# Patient Record
Sex: MALE | Race: BLACK OR AFRICAN AMERICAN | Employment: UNEMPLOYED | URBAN - METROPOLITAN AREA
[De-identification: names, ages, dates, MRNs, and addresses within clinical notes are randomized per-mention and may not be internally consistent; named-entity substitution may affect disease eponyms.]

---

## 2019-04-21 ENCOUNTER — APPOINTMENT (OUTPATIENT)
Dept: GENERAL RADIOLOGY | Age: 29
End: 2019-04-21
Payer: MEDICAID

## 2019-04-21 ENCOUNTER — HOSPITAL ENCOUNTER (EMERGENCY)
Age: 29
Discharge: HOME | End: 2019-04-21
Attending: EMERGENCY MEDICINE
Payer: MEDICAID

## 2019-04-21 VITALS
HEART RATE: 108 BPM | BODY MASS INDEX: 24.69 KG/M2 | TEMPERATURE: 97.7 F | SYSTOLIC BLOOD PRESSURE: 138 MMHG | DIASTOLIC BLOOD PRESSURE: 81 MMHG | OXYGEN SATURATION: 100 % | RESPIRATION RATE: 21 BRPM | WEIGHT: 135 LBS

## 2019-04-21 DIAGNOSIS — M21.829 HILL SACHS DEFORMITY: ICD-10-CM

## 2019-04-21 DIAGNOSIS — S43.004A DISLOCATION OF RIGHT SHOULDER JOINT, INITIAL ENCOUNTER: Primary | ICD-10-CM

## 2019-04-21 PROCEDURE — 73030 X-RAY EXAM OF SHOULDER: CPT

## 2019-04-21 PROCEDURE — 23650 CLTX SHO DSLC W/MNPJ WO ANES: CPT

## 2019-04-21 PROCEDURE — 2500000003 HC RX 250 WO HCPCS: Performed by: STUDENT IN AN ORGANIZED HEALTH CARE EDUCATION/TRAINING PROGRAM

## 2019-04-21 PROCEDURE — 96374 THER/PROPH/DIAG INJ IV PUSH: CPT

## 2019-04-21 PROCEDURE — 99152 MOD SED SAME PHYS/QHP 5/>YRS: CPT

## 2019-04-21 PROCEDURE — 6360000002 HC RX W HCPCS: Performed by: STUDENT IN AN ORGANIZED HEALTH CARE EDUCATION/TRAINING PROGRAM

## 2019-04-21 PROCEDURE — 96375 TX/PRO/DX INJ NEW DRUG ADDON: CPT

## 2019-04-21 PROCEDURE — 99283 EMERGENCY DEPT VISIT LOW MDM: CPT

## 2019-04-21 RX ORDER — LORAZEPAM 2 MG/ML
1 INJECTION INTRAMUSCULAR ONCE
Status: COMPLETED | OUTPATIENT
Start: 2019-04-21 | End: 2019-04-21

## 2019-04-21 RX ORDER — KETAMINE HYDROCHLORIDE 10 MG/ML
1.5 INJECTION, SOLUTION INTRAMUSCULAR; INTRAVENOUS ONCE
Status: COMPLETED | OUTPATIENT
Start: 2019-04-21 | End: 2019-04-21

## 2019-04-21 RX ADMIN — KETAMINE HYDROCHLORIDE 90 MG: 10 INJECTION, SOLUTION INTRAMUSCULAR; INTRAVENOUS at 15:22

## 2019-04-21 RX ADMIN — LORAZEPAM 1 MG: 2 INJECTION INTRAMUSCULAR; INTRAVENOUS at 15:22

## 2019-04-21 ASSESSMENT — ENCOUNTER SYMPTOMS
RHINORRHEA: 0
DIARRHEA: 0
VOMITING: 0
ABDOMINAL DISTENTION: 0
TROUBLE SWALLOWING: 0
SHORTNESS OF BREATH: 0
CONSTIPATION: 0
NAUSEA: 0
COUGH: 0
COLOR CHANGE: 0
ABDOMINAL PAIN: 0

## 2019-04-21 ASSESSMENT — PAIN SCALES - GENERAL: PAINLEVEL_OUTOF10: 0

## 2019-04-21 NOTE — ED NOTES
Pt placed on cardiac monitor, pulse ox and BP cuff. Alarms on.       Jami Hastings RN  04/21/19 3888

## 2019-04-21 NOTE — ED PROVIDER NOTES
[address]  ED  EMERGENCY DEPARTMENT ENCOUNTER  RESIDENT    Pt Name: Allyn King  MRN: [id number]  [place] 1990  Date of evaluation: 4/21/2019  PCP:  No primary care provider on file. CHIEF COMPLAINT       Chief Complaint   Patient presents with    Shoulder Pain     pt to ER from assisted, pt chronically has issues with his right shoulder dislocating, denies trauma or pain     HISTORY OF PRESENT ILLNESS    Allyn King is a 34 y.o. male who presents for right shoulder pain and concern for dislocation that started abruptly one hour ago. Patient has history of multiple anterior shoulder dislocations in the past. Patient is from assisted. He states that he was closing a shower curtain behind him when he felt sudden pop and pain in his right arm. Since that time he has had difficulty moving his right shoulder. He denies difficulty moving his fingers, numbness or tingling in the arm. ROS otherwise negative. Location/Symptom: right shoulder pain/ difficulty with ROM  Timing/Onset: 1 hour ago  Context/Setting: patient was closing shower curtain when it occured  Quality: not painful at this time  Duration: continous  Modifying Factors: none  Severity: N/A     REVIEW OF SYSTEMS       Review of Systems   Constitutional: Negative for chills, fatigue and fever. HENT: Negative for rhinorrhea and trouble swallowing. Eyes: Negative for visual disturbance. Respiratory: Negative for cough and shortness of breath. Cardiovascular: Negative for chest pain, palpitations and leg swelling. Gastrointestinal: Negative for abdominal distention, abdominal pain, constipation, diarrhea, nausea and vomiting. Genitourinary: Negative for dysuria and urgency. Musculoskeletal: Negative for joint swelling. Right shoulder dislocation, decreased ROM at right shoulder   Skin: Negative for color change and wound. Neurological: Negative for dizziness and headaches.    Psychiatric/Behavioral: Negative for agitation and behavioral problems. PAST MEDICAL HISTORY    has a past medical history of Shoulder dislocation, recurrent. SURGICAL HISTORY      has no past surgical history on file. CURRENT MEDICATIONS       Previous Medications    IBUPROFEN (ADVIL;MOTRIN) 800 MG TABLET    Take 1 tablet by mouth every 8 hours as needed for Pain. OXYCODONE-ACETAMINOPHEN (PERCOCET) 5-325 MG PER TABLET    Take 1 tablet by mouth every 6 hours as needed for Pain for 10 doses. WARNING:  May cause drowsiness. May impair ability to operate vehicles or machinery. Do not use in combination with alcohol. ALLERGIES     has No Known Allergies. FAMILY HISTORY     has no family status information on file. family history is not on file. SOCIAL HISTORY      reports that he has been smoking cigars. He has been smoking about 1.00 pack per day. He has never used smokeless tobacco. He reports that he drinks alcohol. He reports that he has current or past drug history. Drug: Marijuana. PHYSICAL EXAM     INITIAL VITALS:  weight is 135 lb (61.2 kg). His oral temperature is 97.7 °F (36.5 °C). His blood pressure is 138/81 and his pulse is 108. His respiration is 21 and oxygen saturation is 100%. Physical Exam   Constitutional: He is oriented to person, place, and time. He appears well-developed and well-nourished. No distress. HENT:   Head: Normocephalic and atraumatic. Eyes: Pupils are equal, round, and reactive to light. Conjunctivae and EOM are normal.   Neck: Normal range of motion. Neck supple. Cardiovascular: Normal rate, regular rhythm, normal heart sounds and intact distal pulses. No murmur heard. Pulmonary/Chest: Effort normal and breath sounds normal. No respiratory distress. Abdominal: Soft. Bowel sounds are normal. He exhibits no distension. There is no tenderness. Musculoskeletal: He exhibits deformity. Palpable step off on right shoulder, passive and active ROM severe limited.  Axillary nerve sensation in tact. Neurovascularly intact    Neurological: He is alert and oriented to person, place, and time. He displays normal reflexes. No cranial nerve deficit or sensory deficit. He exhibits normal muscle tone. Coordination normal.   Skin: Skin is warm. Capillary refill takes less than 2 seconds. No erythema. Psychiatric: He has a normal mood and affect. His behavior is normal.     DIFFERENTIAL DIAGNOSIS/MDM:   Differential diagnosis: shoulder subluxation, dislocation, fx    Shoulder xray showed anterior dislocation, will sedate for reduction    DIAGNOSTIC RESULTS     EKG: All EKG's are interpreted by the Emergency Department Physician who either signs or Co-signs this chart in the absence of a cardiologist.    none    RADIOLOGY:   I directly visualized the following  images and reviewed the radiologist interpretations:  XR SHOULDER RIGHT (MIN 2 VIEWS)   Final Result   Appropriate anatomic appearance of the right shoulder post reduction         XR SHOULDER RIGHT (MIN 2 VIEWS)   Final Result   Anterior dislocation of the right shoulder with suspected small Hill-Sachs   deformity. ED BEDSIDE ULTRASOUND:   None     LABS:  Labs Reviewed - No data to display    EMERGENCY DEPARTMENT COURSE:   Vitals:    Vitals:    04/21/19 1547 04/21/19 1551 04/21/19 1601 04/21/19 1611   BP:  (!) 148/91 (!) 143/86 138/81   Pulse: 103 76 89 108   Resp: 15 11 18 21   Temp:       TempSrc:       SpO2: 100% 100% 100% 100%   Weight:             CRITICAL CARE:  0 minutes critical care     CONSULTS:  None      PROCEDURES:    Procedural sedation  Date/Time: 4/21/2019 4:05 PM  Performed by: Yola Pedroza MD  Authorized by: Mina Norman MD     Consent:     Consent obtained:  Verbal    Consent given by:  Patient    Risks discussed:   Allergic reaction, inadequate sedation, respiratory compromise necessitating ventilatory assistance and intubation and nausea    Alternatives discussed:  Anxiolysis  Indications:     Procedure performed:  Dislocation reduction    Procedure necessitating sedation performed by:  Different physician    Intended level of sedation:  Moderate (conscious sedation)  Procedure details (see MAR for exact dosages):     Preoxygenation:  Nasal cannula    Sedation:  Ketamine    Intra-procedure monitoring:  Blood pressure monitoring, continuous capnometry, continuous pulse oximetry and frequent vital sign checks    Intra-procedure events: none    Post-procedure details:     Recovery: Patient returned to pre-procedure baseline      Post-sedation assessments completed and reviewed: mental status, pain level and respiratory function      Patient is stable for discharge or admission: yes      Patient tolerance: Tolerated well, no immediate complications      Shoulder reduction performed by manipulation under ketamine sedation. Post reduction xray were obtained and demonstrated appropriate anatomical alinement. Patient was monitored post sedation. Vitally stable. Return to baseline mentation. Able to tolerate PO and ambulate without difficulty. FINAL IMPRESSION      1. Dislocation of right shoulder joint, initial encounter    2. Hill Sachs deformity        Post reduction radiographs showed appropriate anatomic alinement. Patient discharged with sling and instructed on gentle ROM with limited lifting for the next week. Given referral for orthopedics in 1-2 weeks.      DISPOSITION/PLAN   DISPOSITION      discharge    PATIENT REFERRED TO:  [facility] ED  [address] [place] [address]  [phone]    If symptoms worsen or reccur      [facility] orthopedic surgeons   1-2 weeks          DISCHARGE MEDICATIONS:  New Prescriptions    No medications on file       (Please note that portions of this note were completed with a voice recognition program.  Efforts were made to edit the dictations but occasionally words are mis-transcribed.)    Neva Maldonado  Transitional Year Resident Susan Goff MD  Resident  04/21/19 9976       Susan Goff MD  Resident  04/21/19 9505

## 2019-04-21 NOTE — ED NOTES
Bed: 01  Expected date:   Expected time:   Means of arrival:   Comments:     Fady Allison RN  04/21/19 7505

## 2019-04-21 NOTE — ED PROVIDER NOTES
present for the key portions of any procedures. I have documented in the chart those procedures where I was not present during the key portions. I have personally reviewed all images and agree with the resident's interpretation. I have reviewed the emergency nurses triage note. I agree with the chief complaint, past medical history, past surgical history, allergies, medications, social and family history as documented unless otherwise noted below. Documentation of the HPI, Physical Exam and Medical Decision Making performed by medical students or scribes is based on my personal performance of the HPI, PE and MDM. For Phys Assistant/ Nurse Practitioner cases/documentation I have had a face to face evaluation of this patient and have completed at least one if not all key elements of the E/M (history, physical exam, and MDM). Additional findings are as noted. For APC cases I have personally evaluated and examined the patient in conjunction with the APC and agree with the treatment plan and disposition of the patient as recorded by the APC.     Mamadou Jordan MD  Attending Emergency  Physician        Pb Buck MD  04/21/19 2516

## 2019-04-21 NOTE — ED NOTES
Pt remains lethargic but is answering questions appropriately and denies pain      Ashly Winslow, SHAKIRA  04/21/19 8308

## 2019-04-21 NOTE — ED NOTES
alf guards remain at bedside  Pt. Talking in complete sentences answering questions appropriately   Will continue to monitor. Resting on stretcher, eyes open, RR even and non-labored.        Marina Arciniega RN  04/21/19 6403

## 2022-08-02 ENCOUNTER — HOSPITAL ENCOUNTER (EMERGENCY)
Age: 32
Discharge: HOME | End: 2022-08-02
Attending: EMERGENCY MEDICINE

## 2022-08-02 VITALS
TEMPERATURE: 100 F | HEIGHT: 62 IN | HEART RATE: 106 BPM | RESPIRATION RATE: 20 BRPM | SYSTOLIC BLOOD PRESSURE: 139 MMHG | DIASTOLIC BLOOD PRESSURE: 81 MMHG | OXYGEN SATURATION: 90 % | WEIGHT: 167.4 LBS | BODY MASS INDEX: 30.8 KG/M2

## 2022-08-02 DIAGNOSIS — L02.91 ABSCESS: Primary | ICD-10-CM

## 2022-08-02 PROCEDURE — 99283 EMERGENCY DEPT VISIT LOW MDM: CPT

## 2022-08-02 PROCEDURE — 6370000000 HC RX 637 (ALT 250 FOR IP): Performed by: NURSE PRACTITIONER

## 2022-08-02 RX ORDER — IBUPROFEN 800 MG/1
800 TABLET ORAL EVERY 8 HOURS PRN
Qty: 30 TABLET | Refills: 0 | Status: SHIPPED | OUTPATIENT
Start: 2022-08-02

## 2022-08-02 RX ORDER — SULFAMETHOXAZOLE AND TRIMETHOPRIM 800; 160 MG/1; MG/1
1 TABLET ORAL ONCE
Status: COMPLETED | OUTPATIENT
Start: 2022-08-02 | End: 2022-08-02

## 2022-08-02 RX ORDER — CEPHALEXIN 500 MG/1
500 CAPSULE ORAL ONCE
Status: COMPLETED | OUTPATIENT
Start: 2022-08-02 | End: 2022-08-02

## 2022-08-02 RX ORDER — SULFAMETHOXAZOLE AND TRIMETHOPRIM 800; 160 MG/1; MG/1
1 TABLET ORAL 2 TIMES DAILY
Qty: 20 TABLET | Refills: 0 | Status: SHIPPED | OUTPATIENT
Start: 2022-08-02 | End: 2022-08-12

## 2022-08-02 RX ORDER — CEPHALEXIN 500 MG/1
500 CAPSULE ORAL 4 TIMES DAILY
Qty: 40 CAPSULE | Refills: 0 | Status: SHIPPED | OUTPATIENT
Start: 2022-08-02 | End: 2022-08-12

## 2022-08-02 RX ADMIN — CEPHALEXIN 500 MG: 500 CAPSULE ORAL at 15:45

## 2022-08-02 RX ADMIN — SULFAMETHOXAZOLE AND TRIMETHOPRIM 1 TABLET: 800; 160 TABLET ORAL at 15:45

## 2022-08-02 ASSESSMENT — ENCOUNTER SYMPTOMS: COLOR CHANGE: 1

## 2022-08-02 ASSESSMENT — PAIN SCALES - GENERAL
PAINLEVEL_OUTOF10: 5
PAINLEVEL_OUTOF10: 7

## 2022-08-02 ASSESSMENT — PAIN DESCRIPTION - LOCATION: LOCATION: OTHER (COMMENT)

## 2022-08-02 ASSESSMENT — PAIN - FUNCTIONAL ASSESSMENT: PAIN_FUNCTIONAL_ASSESSMENT: 0-10

## 2022-08-02 NOTE — ED PROVIDER NOTES
[facility] ED  eMERGENCY dEPARTMENT eNCOUnter      Pt Name: Sonal Mccarthy  MRN: [id number]  [place] 1990  Date of evaluation: 8/2/2022  Provider: LEATHA Pierce - CNP    CHIEF COMPLAINT       Chief Complaint   Patient presents with    Abscess     X2 days, left inner thigh         HISTORY OF PRESENT ILLNESS  (Location/Symptom, Timing/Onset, Context/Setting, Quality, Duration, Modifying Factors, Severity.)   Sonal Mccarthy is a 29 y.o. male who presents to the emergency department via private auto for an erythematous, raised, painful area to his left groin. Onset was 2 days ago. Denies fever, chills, injury, drainage. Rates his pain 7/10 at this time. Nursing Notes were reviewed. ALLERGIES     Patient has no known allergies. CURRENT MEDICATIONS       Discharge Medication List as of 8/2/2022  4:21 PM        CONTINUE these medications which have NOT CHANGED    Details   !! ibuprofen (ADVIL;MOTRIN) 800 MG tablet Take 1 tablet by mouth every 8 hours as needed for Pain., Disp-30 tablet, R-0      oxyCODONE-acetaminophen (PERCOCET) 5-325 MG per tablet Take 1 tablet by mouth every 6 hours as needed for Pain for 10 doses. WARNING:  May cause drowsiness. May impair ability to operate vehicles or machinery. Do not use in combination with alcohol., Disp-10 tablet, R-0       !! - Potential duplicate medications found. Please discuss with provider. PAST MEDICAL HISTORY         Diagnosis Date    Shoulder dislocation, recurrent     right side       SURGICAL HISTORY     No past surgical history on file. FAMILY HISTORY     No family history on file. No family status information on file. SOCIAL HISTORY      reports that he has been smoking cigars. He has been smoking an average of 1 pack per day. He has never used smokeless tobacco. He reports current alcohol use. He reports current drug use. Drug: Marijuana Diamond Mcmahon).     REVIEW OF SYSTEMS    (2-9 systems for level 4, 10 or more for level 5)     Review of Systems   Constitutional:  Negative for chills, diaphoresis, fatigue and fever. Musculoskeletal:  Negative for arthralgias and myalgias. Skin:  Positive for color change. Negative for rash and wound. Neurological:  Negative for weakness. Except as noted above the remainder of the review of systems was reviewed and negative. PHYSICAL EXAM    (up to 7 for level 4, 8 or more for level 5)     ED Triage Vitals [08/02/22 1509]   BP Temp Temp Source Heart Rate Resp SpO2 Height Weight   139/81 100 °F (37.8 °C) Oral (!) 106 20 90 % 5' 2\" (1.575 m) 167 lb 6.4 oz (75.9 kg)     Physical Exam  Vitals reviewed. Exam conducted with a chaperone present (Anna Ramos. PCT). Constitutional:       General: He is not in acute distress. Appearance: He is well-developed. He is not diaphoretic. Eyes:      Conjunctiva/sclera: Conjunctivae normal.   Cardiovascular:      Rate and Rhythm: Normal rate. Pulmonary:      Effort: Pulmonary effort is normal. No respiratory distress. Breath sounds: No stridor. Abdominal:      General: There is no distension. Palpations: Abdomen is soft. Tenderness: There is no abdominal tenderness. Genitourinary:     Comments: Erythematous, minimally raised, tender area to left proximal medial thigh. Area is approx 5 mm in diameter. No signs of Dana's gangrene at this time. Musculoskeletal:      Comments: Moves extremities. Skin:     General: Skin is warm and dry. Findings: No rash. Neurological:      Mental Status: He is alert and oriented to person, place, and time.    Psychiatric:         Behavior: Behavior normal.       EMERGENCY DEPARTMENT COURSE and DIFFERENTIAL DIAGNOSIS/MDM:   Vitals:    Vitals:    08/02/22 1509   BP: 139/81   Pulse: (!) 106   Resp: 20   Temp: 100 °F (37.8 °C)   TempSrc: Oral   SpO2: 90%   Weight: 167 lb 6.4 oz (75.9 kg)   Height: 5' 2\" (1.575 m)         MEDICATIONS GIVEN IN THE ED:  Medications cephALEXin (KEFLEX) capsule 500 mg (500 mg Oral Given 8/2/22 1545)   sulfamethoxazole-trimethoprim (BACTRIM DS;SEPTRA DS) 800-160 MG per tablet 1 tablet (1 tablet Oral Given 8/2/22 1545)       CLINICAL DECISION MAKING:  The patient presented alert with a nontoxic appearance and was seen in conjunction with Dr. Celina Skaggs. Prescriptions were written for bactrim and keflex. Return precautions were discussed. Warm, moist compresses were discussed. Follow up with pcp for a recheck, further evaluation and treatment. Evaluation and treatment course in the ED, and plan of care upon discharge was discussed in length with the patient. Patient had no further questions prior to being discharged and was instructed to return to the ED for new or worsening symptoms. Care was provided during an unprecedented national emergency due to the novel coronavirus, Covid-19. FINAL IMPRESSION      1. Abscess                DISPOSITION/PLAN   DISPOSITION Decision To Discharge 08/02/2022 03:37:50 PM      PATIENT REFERRED TO:   Call Izabella Chris to establish care for follow up    Schedule an appointment as soon as possible for a visit       [facility] ED  [address] [facility]  [phone]    If symptoms worsen, As needed    DISCHARGE MEDICATIONS:     Discharge Medication List as of 8/2/2022  4:21 PM        START taking these medications    Details   sulfamethoxazole-trimethoprim (BACTRIM DS) 800-160 MG per tablet Take 1 tablet by mouth in the morning and 1 tablet before bedtime. Do all this for 10 days. , Disp-20 tablet, R-0Print      cephALEXin (KEFLEX) 500 MG capsule Take 1 capsule by mouth in the morning and 1 capsule at noon and 1 capsule in the evening and 1 capsule before bedtime. Do all this for 10 days. , Disp-40 capsule, R-0Print      !! ibuprofen (ADVIL;MOTRIN) 800 MG tablet Take 1 tablet by mouth every 8 hours as needed for Pain or Fever, Disp-30 tablet, R-0Print       !! - Potential duplicate medications found. Please discuss with provider.               (Please note that portions of this note were completed with a voice recognition program.  Efforts were made to edit the dictations but occasionally words are mis-transcribed.)    Barb Vanessa, LEATHA Flores CNP, CNP  08/02/22 8530

## 2022-08-02 NOTE — ED PROVIDER NOTES
eMERGENCY dEPARTMENT eNCOUnter   [address] [place] Name: Jeanie Graham  MRN: [id number]  [place] 1990  Date of evaluation: 8/2/22     Jeanie Graham is a 29 y.o. male with CC: Abscess (X2 days, left inner thigh)        This visit was performed by both a physician and an APC. I performed all aspects of the MDM as documented.       The care is provided during an unprecedented national emergency due to the novel coronavirus, Gloriajean Aschoff, MD  Attending Emergency Physician           Sedrick Morales MD  08/02/22 1600

## 2022-08-02 NOTE — ED NOTES
Patient reports having pain to inner thigh from a hard mass that begin 3 days ago according to pt     Su Watt RN  08/02/22 1530

## 2022-08-05 ENCOUNTER — HOSPITAL ENCOUNTER (EMERGENCY)
Age: 32
Discharge: LEFT BEFORE DISCHARGE | End: 2022-08-05
Attending: EMERGENCY MEDICINE

## 2022-08-05 VITALS
HEIGHT: 62 IN | SYSTOLIC BLOOD PRESSURE: 127 MMHG | TEMPERATURE: 98.7 F | HEART RATE: 79 BPM | DIASTOLIC BLOOD PRESSURE: 84 MMHG | BODY MASS INDEX: 30.73 KG/M2 | OXYGEN SATURATION: 98 % | WEIGHT: 167 LBS | RESPIRATION RATE: 18 BRPM

## 2022-08-05 ASSESSMENT — PAIN - FUNCTIONAL ASSESSMENT: PAIN_FUNCTIONAL_ASSESSMENT: NONE - DENIES PAIN

## 2022-08-05 NOTE — ED NOTES
Antonio Booth, CNP reports went in to see/assess patient and patient was gone and gown left on the bed, pt apparently eloped from care.       Maryanne Nieto, SHAKIRA  02/15/45 3677
